# Patient Record
Sex: MALE | Race: ASIAN | NOT HISPANIC OR LATINO
[De-identification: names, ages, dates, MRNs, and addresses within clinical notes are randomized per-mention and may not be internally consistent; named-entity substitution may affect disease eponyms.]

---

## 2022-01-26 ENCOUNTER — TRANSCRIPTION ENCOUNTER (OUTPATIENT)
Age: 72
End: 2022-01-26

## 2022-01-26 VITALS
OXYGEN SATURATION: 95 % | WEIGHT: 171.08 LBS | SYSTOLIC BLOOD PRESSURE: 151 MMHG | TEMPERATURE: 98 F | DIASTOLIC BLOOD PRESSURE: 84 MMHG | HEART RATE: 76 BPM | RESPIRATION RATE: 18 BRPM | HEIGHT: 68 IN

## 2022-01-26 LAB
ALBUMIN SERPL ELPH-MCNC: 4.1 G/DL — SIGNIFICANT CHANGE UP (ref 3.3–5)
ALP SERPL-CCNC: 173 U/L — HIGH (ref 40–120)
ALT FLD-CCNC: 183 U/L — HIGH (ref 10–45)
ANION GAP SERPL CALC-SCNC: 12 MMOL/L — SIGNIFICANT CHANGE UP (ref 5–17)
APTT BLD: 38.3 SEC — HIGH (ref 27.5–35.5)
AST SERPL-CCNC: 519 U/L — HIGH (ref 10–40)
BASOPHILS # BLD AUTO: 0.03 K/UL — SIGNIFICANT CHANGE UP (ref 0–0.2)
BASOPHILS NFR BLD AUTO: 0.2 % — SIGNIFICANT CHANGE UP (ref 0–2)
BILIRUB SERPL-MCNC: 2 MG/DL — HIGH (ref 0.2–1.2)
BLD GP AB SCN SERPL QL: NEGATIVE — SIGNIFICANT CHANGE UP
BUN SERPL-MCNC: 43 MG/DL — HIGH (ref 7–23)
CALCIUM SERPL-MCNC: 8.5 MG/DL — SIGNIFICANT CHANGE UP (ref 8.4–10.5)
CHLORIDE SERPL-SCNC: 100 MMOL/L — SIGNIFICANT CHANGE UP (ref 96–108)
CO2 SERPL-SCNC: 23 MMOL/L — SIGNIFICANT CHANGE UP (ref 22–31)
CREAT SERPL-MCNC: 1.82 MG/DL — HIGH (ref 0.5–1.3)
EOSINOPHIL # BLD AUTO: 0.01 K/UL — SIGNIFICANT CHANGE UP (ref 0–0.5)
EOSINOPHIL NFR BLD AUTO: 0.1 % — SIGNIFICANT CHANGE UP (ref 0–6)
GLUCOSE SERPL-MCNC: 147 MG/DL — HIGH (ref 70–99)
HCT VFR BLD CALC: 42.1 % — SIGNIFICANT CHANGE UP (ref 39–50)
HGB BLD-MCNC: 14.1 G/DL — SIGNIFICANT CHANGE UP (ref 13–17)
IMM GRANULOCYTES NFR BLD AUTO: 0.9 % — SIGNIFICANT CHANGE UP (ref 0–1.5)
INR BLD: 0.99 — SIGNIFICANT CHANGE UP (ref 0.88–1.16)
LACTATE SERPL-SCNC: 1.1 MMOL/L — SIGNIFICANT CHANGE UP (ref 0.5–2)
LYMPHOCYTES # BLD AUTO: 1.38 K/UL — SIGNIFICANT CHANGE UP (ref 1–3.3)
LYMPHOCYTES # BLD AUTO: 10.1 % — LOW (ref 13–44)
MCHC RBC-ENTMCNC: 33.5 GM/DL — SIGNIFICANT CHANGE UP (ref 32–36)
MCHC RBC-ENTMCNC: 33.9 PG — SIGNIFICANT CHANGE UP (ref 27–34)
MCV RBC AUTO: 101.2 FL — HIGH (ref 80–100)
MONOCYTES # BLD AUTO: 0.82 K/UL — SIGNIFICANT CHANGE UP (ref 0–0.9)
MONOCYTES NFR BLD AUTO: 6 % — SIGNIFICANT CHANGE UP (ref 2–14)
NEUTROPHILS # BLD AUTO: 11.27 K/UL — HIGH (ref 1.8–7.4)
NEUTROPHILS NFR BLD AUTO: 82.7 % — HIGH (ref 43–77)
NRBC # BLD: 0 /100 WBCS — SIGNIFICANT CHANGE UP (ref 0–0)
PLATELET # BLD AUTO: 122 K/UL — LOW (ref 150–400)
POTASSIUM SERPL-MCNC: 3.2 MMOL/L — LOW (ref 3.5–5.3)
POTASSIUM SERPL-SCNC: 3.2 MMOL/L — LOW (ref 3.5–5.3)
PROT SERPL-MCNC: 7.5 G/DL — SIGNIFICANT CHANGE UP (ref 6–8.3)
PROTHROM AB SERPL-ACNC: 11.9 SEC — SIGNIFICANT CHANGE UP (ref 10.6–13.6)
RBC # BLD: 4.16 M/UL — LOW (ref 4.2–5.8)
RBC # FLD: 14.6 % — HIGH (ref 10.3–14.5)
RH IG SCN BLD-IMP: POSITIVE — SIGNIFICANT CHANGE UP
SARS-COV-2 RNA SPEC QL NAA+PROBE: NEGATIVE — SIGNIFICANT CHANGE UP
SODIUM SERPL-SCNC: 135 MMOL/L — SIGNIFICANT CHANGE UP (ref 135–145)
WBC # BLD: 13.63 K/UL — HIGH (ref 3.8–10.5)
WBC # FLD AUTO: 13.63 K/UL — HIGH (ref 3.8–10.5)

## 2022-01-26 PROCEDURE — 71045 X-RAY EXAM CHEST 1 VIEW: CPT | Mod: 26

## 2022-01-26 PROCEDURE — 99285 EMERGENCY DEPT VISIT HI MDM: CPT | Mod: 25

## 2022-01-26 PROCEDURE — 93010 ELECTROCARDIOGRAM REPORT: CPT

## 2022-01-26 RX ORDER — SODIUM CHLORIDE 9 MG/ML
1000 INJECTION INTRAMUSCULAR; INTRAVENOUS; SUBCUTANEOUS ONCE
Refills: 0 | Status: COMPLETED | OUTPATIENT
Start: 2022-01-26 | End: 2022-01-26

## 2022-01-26 RX ADMIN — SODIUM CHLORIDE 1000 MILLILITER(S): 9 INJECTION INTRAMUSCULAR; INTRAVENOUS; SUBCUTANEOUS at 23:34

## 2022-01-26 NOTE — ED ADULT NURSE NOTE - OBJECTIVE STATEMENT
Pt presented to er with c/o RUQ discomfort since yesterday. Pt was at [facility] diagnosed with acute cholecystitis. As per family pt requires surgery to remove gallbladder, here for pre-op. Pt denies pain, n/v/d, cp, sob. Pt A&Ox4, ambulatory with steady gait, speaking in clear/full sentences, no acute distress, vital signs stable.

## 2022-01-26 NOTE — ED PROVIDER NOTE - CLINICAL SUMMARY MEDICAL DECISION MAKING FREE TEXT BOX
RUQ abd pain, diagnosed with cholecystitis at [facility]  -check labs  -US  -cxr  -ekg  -surgery consult

## 2022-01-26 NOTE — ED PROVIDER NOTE - OBJECTIVE STATEMENT
71M hx htn, high chol, bph, gout, MM (s/p stem cell transplant), c/o RUQ abd pain. pt states pain ongoing for past 2 days. no n/v/d. no fevers. no cough. no recent travel. decreased appetite.  pt was seen at [facility] and diagnosed with cholecystitis.  pt given ABX.  pt wanted to come here for Dr. Gilmore.

## 2022-01-26 NOTE — ED PROVIDER NOTE - NSICDXPASTMEDICALHX_GEN_ALL_CORE_FT
PAST MEDICAL HISTORY:  BPH (benign prostatic hyperplasia)     Gout     High cholesterol     HTN (hypertension)     Multiple myeloma

## 2022-01-26 NOTE — ED ADULT NURSE NOTE - NSIMPLEMENTINTERV_GEN_ALL_ED
Implemented All Universal Safety Interventions:  [place] to call system. Call bell, personal items and telephone within reach. Instruct patient to call for assistance. Room bathroom lighting operational. Non-slip footwear when patient is off stretcher. Physically safe environment: no spills, clutter or unnecessary equipment. Stretcher in lowest position, wheels locked, appropriate side rails in place.

## 2022-01-27 ENCOUNTER — INPATIENT (INPATIENT)
Facility: HOSPITAL | Age: 72
LOS: 2 days | Discharge: HOME | DRG: 417 | End: 2022-01-30
Attending: SURGERY | Admitting: SURGERY
Payer: MEDICARE

## 2022-01-27 ENCOUNTER — RESULT REVIEW (OUTPATIENT)
Age: 72
End: 2022-01-27

## 2022-01-27 DIAGNOSIS — Z94.84 STEM CELLS TRANSPLANT STATUS: Chronic | ICD-10-CM

## 2022-01-27 LAB
ALBUMIN SERPL ELPH-MCNC: 3.4 G/DL — SIGNIFICANT CHANGE UP (ref 3.3–5)
ALBUMIN SERPL ELPH-MCNC: 3.5 G/DL — SIGNIFICANT CHANGE UP (ref 3.3–5)
ALP SERPL-CCNC: 168 U/L — HIGH (ref 40–120)
ALP SERPL-CCNC: 181 U/L — HIGH (ref 40–120)
ALT FLD-CCNC: 179 U/L — HIGH (ref 10–45)
ALT FLD-CCNC: SIGNIFICANT CHANGE UP U/L (ref 10–45)
ANION GAP SERPL CALC-SCNC: 11 MMOL/L — SIGNIFICANT CHANGE UP (ref 5–17)
ANION GAP SERPL CALC-SCNC: 12 MMOL/L — SIGNIFICANT CHANGE UP (ref 5–17)
ANION GAP SERPL CALC-SCNC: 13 MMOL/L — SIGNIFICANT CHANGE UP (ref 5–17)
APPEARANCE UR: ABNORMAL
AST SERPL-CCNC: 281 U/L — HIGH (ref 10–40)
AST SERPL-CCNC: SIGNIFICANT CHANGE UP U/L (ref 10–40)
BACTERIA # UR AUTO: ABNORMAL /HPF
BILIRUB DIRECT SERPL-MCNC: 0.5 MG/DL — HIGH (ref 0–0.3)
BILIRUB INDIRECT FLD-MCNC: 1 MG/DL — SIGNIFICANT CHANGE UP (ref 0.2–1)
BILIRUB SERPL-MCNC: 1.4 MG/DL — HIGH (ref 0.2–1.2)
BILIRUB SERPL-MCNC: 1.5 MG/DL — HIGH (ref 0.2–1.2)
BILIRUB SERPL-MCNC: 1.6 MG/DL — HIGH (ref 0.2–1.2)
BILIRUB UR-MCNC: NEGATIVE — SIGNIFICANT CHANGE UP
BLD GP AB SCN SERPL QL: NEGATIVE — SIGNIFICANT CHANGE UP
BUN SERPL-MCNC: 39 MG/DL — HIGH (ref 7–23)
BUN SERPL-MCNC: 40 MG/DL — HIGH (ref 7–23)
BUN SERPL-MCNC: 43 MG/DL — HIGH (ref 7–23)
CALCIUM SERPL-MCNC: 8 MG/DL — LOW (ref 8.4–10.5)
CALCIUM SERPL-MCNC: 8.1 MG/DL — LOW (ref 8.4–10.5)
CALCIUM SERPL-MCNC: 8.6 MG/DL — SIGNIFICANT CHANGE UP (ref 8.4–10.5)
CHLORIDE SERPL-SCNC: 104 MMOL/L — SIGNIFICANT CHANGE UP (ref 96–108)
CHLORIDE SERPL-SCNC: 106 MMOL/L — SIGNIFICANT CHANGE UP (ref 96–108)
CHLORIDE SERPL-SCNC: 109 MMOL/L — HIGH (ref 96–108)
CO2 SERPL-SCNC: 19 MMOL/L — LOW (ref 22–31)
CO2 SERPL-SCNC: 19 MMOL/L — LOW (ref 22–31)
CO2 SERPL-SCNC: 20 MMOL/L — LOW (ref 22–31)
COLOR SPEC: YELLOW — SIGNIFICANT CHANGE UP
CREAT SERPL-MCNC: 1.6 MG/DL — HIGH (ref 0.5–1.3)
CREAT SERPL-MCNC: 1.63 MG/DL — HIGH (ref 0.5–1.3)
CREAT SERPL-MCNC: 1.7 MG/DL — HIGH (ref 0.5–1.3)
DIFF PNL FLD: ABNORMAL
EPI CELLS # UR: SIGNIFICANT CHANGE UP /HPF (ref 0–5)
GLUCOSE SERPL-MCNC: 106 MG/DL — HIGH (ref 70–99)
GLUCOSE SERPL-MCNC: 127 MG/DL — HIGH (ref 70–99)
GLUCOSE SERPL-MCNC: 183 MG/DL — HIGH (ref 70–99)
GLUCOSE UR QL: NEGATIVE — SIGNIFICANT CHANGE UP
HCT VFR BLD CALC: 34.5 % — LOW (ref 39–50)
HCT VFR BLD CALC: 39.9 % — SIGNIFICANT CHANGE UP (ref 39–50)
HCV AB S/CO SERPL IA: 0.06 S/CO — SIGNIFICANT CHANGE UP
HCV AB SERPL-IMP: SIGNIFICANT CHANGE UP
HGB BLD-MCNC: 11.2 G/DL — LOW (ref 13–17)
HGB BLD-MCNC: 13.2 G/DL — SIGNIFICANT CHANGE UP (ref 13–17)
HYALINE CASTS # UR AUTO: SIGNIFICANT CHANGE UP /LPF (ref 0–2)
KETONES UR-MCNC: NEGATIVE — SIGNIFICANT CHANGE UP
LEUKOCYTE ESTERASE UR-ACNC: ABNORMAL
LIDOCAIN IGE QN: 210 U/L — HIGH (ref 7–60)
MAGNESIUM SERPL-MCNC: 1.8 MG/DL — SIGNIFICANT CHANGE UP (ref 1.6–2.6)
MAGNESIUM SERPL-MCNC: 1.9 MG/DL — SIGNIFICANT CHANGE UP (ref 1.6–2.6)
MCHC RBC-ENTMCNC: 32.5 GM/DL — SIGNIFICANT CHANGE UP (ref 32–36)
MCHC RBC-ENTMCNC: 32.8 PG — SIGNIFICANT CHANGE UP (ref 27–34)
MCHC RBC-ENTMCNC: 33.1 GM/DL — SIGNIFICANT CHANGE UP (ref 32–36)
MCHC RBC-ENTMCNC: 33.1 PG — SIGNIFICANT CHANGE UP (ref 27–34)
MCV RBC AUTO: 100 FL — SIGNIFICANT CHANGE UP (ref 80–100)
MCV RBC AUTO: 101.2 FL — HIGH (ref 80–100)
NITRITE UR-MCNC: POSITIVE
NRBC # BLD: 0 /100 WBCS — SIGNIFICANT CHANGE UP (ref 0–0)
NRBC # BLD: 0 /100 WBCS — SIGNIFICANT CHANGE UP (ref 0–0)
PH UR: 6 — SIGNIFICANT CHANGE UP (ref 5–8)
PHOSPHATE SERPL-MCNC: 2.6 MG/DL — SIGNIFICANT CHANGE UP (ref 2.5–4.5)
PHOSPHATE SERPL-MCNC: 3.5 MG/DL — SIGNIFICANT CHANGE UP (ref 2.5–4.5)
PLATELET # BLD AUTO: 112 K/UL — LOW (ref 150–400)
PLATELET # BLD AUTO: 117 K/UL — LOW (ref 150–400)
POTASSIUM SERPL-MCNC: 4 MMOL/L — SIGNIFICANT CHANGE UP (ref 3.5–5.3)
POTASSIUM SERPL-MCNC: 4.8 MMOL/L — SIGNIFICANT CHANGE UP (ref 3.5–5.3)
POTASSIUM SERPL-MCNC: SIGNIFICANT CHANGE UP MMOL/L (ref 3.5–5.3)
POTASSIUM SERPL-SCNC: 4 MMOL/L — SIGNIFICANT CHANGE UP (ref 3.5–5.3)
POTASSIUM SERPL-SCNC: 4.8 MMOL/L — SIGNIFICANT CHANGE UP (ref 3.5–5.3)
POTASSIUM SERPL-SCNC: SIGNIFICANT CHANGE UP MMOL/L (ref 3.5–5.3)
PROT SERPL-MCNC: 6.7 G/DL — SIGNIFICANT CHANGE UP (ref 6–8.3)
PROT SERPL-MCNC: 6.8 G/DL — SIGNIFICANT CHANGE UP (ref 6–8.3)
PROT UR-MCNC: 100 MG/DL
RBC # BLD: 3.41 M/UL — LOW (ref 4.2–5.8)
RBC # BLD: 3.99 M/UL — LOW (ref 4.2–5.8)
RBC # FLD: 14.3 % — SIGNIFICANT CHANGE UP (ref 10.3–14.5)
RBC # FLD: 14.5 % — SIGNIFICANT CHANGE UP (ref 10.3–14.5)
RBC CASTS # UR COMP ASSIST: ABNORMAL /HPF
RH IG SCN BLD-IMP: POSITIVE — SIGNIFICANT CHANGE UP
SODIUM SERPL-SCNC: 136 MMOL/L — SIGNIFICANT CHANGE UP (ref 135–145)
SODIUM SERPL-SCNC: 137 MMOL/L — SIGNIFICANT CHANGE UP (ref 135–145)
SODIUM SERPL-SCNC: 140 MMOL/L — SIGNIFICANT CHANGE UP (ref 135–145)
SP GR SPEC: 1.02 — SIGNIFICANT CHANGE UP (ref 1–1.03)
UROBILINOGEN FLD QL: 0.2 E.U./DL — SIGNIFICANT CHANGE UP
WBC # BLD: 11.05 K/UL — HIGH (ref 3.8–10.5)
WBC # BLD: 11.92 K/UL — HIGH (ref 3.8–10.5)
WBC # FLD AUTO: 11.05 K/UL — HIGH (ref 3.8–10.5)
WBC # FLD AUTO: 11.92 K/UL — HIGH (ref 3.8–10.5)
WBC UR QL: > 10 /HPF

## 2022-01-27 PROCEDURE — 76705 ECHO EXAM OF ABDOMEN: CPT | Mod: 26

## 2022-01-27 PROCEDURE — 99223 1ST HOSP IP/OBS HIGH 75: CPT | Mod: 57

## 2022-01-27 PROCEDURE — 88304 TISSUE EXAM BY PATHOLOGIST: CPT | Mod: 26

## 2022-01-27 PROCEDURE — 88300 SURGICAL PATH GROSS: CPT | Mod: 26,59

## 2022-01-27 PROCEDURE — 74183 MRI ABD W/O CNTR FLWD CNTR: CPT | Mod: 26

## 2022-01-27 RX ORDER — ACETAMINOPHEN 500 MG
500 TABLET ORAL ONCE
Refills: 0 | Status: COMPLETED | OUTPATIENT
Start: 2022-01-27 | End: 2022-01-27

## 2022-01-27 RX ORDER — CALCITRIOL 0.5 UG/1
1 CAPSULE ORAL
Qty: 0 | Refills: 0 | DISCHARGE

## 2022-01-27 RX ORDER — OXYCODONE HYDROCHLORIDE 5 MG/1
10 TABLET ORAL EVERY 6 HOURS
Refills: 0 | Status: DISCONTINUED | OUTPATIENT
Start: 2022-01-27 | End: 2022-01-28

## 2022-01-27 RX ORDER — ACETAMINOPHEN 500 MG
650 TABLET ORAL EVERY 6 HOURS
Refills: 0 | Status: DISCONTINUED | OUTPATIENT
Start: 2022-01-27 | End: 2022-01-28

## 2022-01-27 RX ORDER — OMEGA-3 ACID ETHYL ESTERS 1 G
1 CAPSULE ORAL
Qty: 0 | Refills: 0 | DISCHARGE

## 2022-01-27 RX ORDER — ALLOPURINOL 300 MG
1 TABLET ORAL
Qty: 0 | Refills: 0 | DISCHARGE

## 2022-01-27 RX ORDER — POTASSIUM CHLORIDE 20 MEQ
10 PACKET (EA) ORAL
Refills: 0 | Status: COMPLETED | OUTPATIENT
Start: 2022-01-27 | End: 2022-01-27

## 2022-01-27 RX ORDER — CHOLECALCIFEROL (VITAMIN D3) 125 MCG
1 CAPSULE ORAL
Qty: 0 | Refills: 0 | DISCHARGE

## 2022-01-27 RX ORDER — OXYCODONE HYDROCHLORIDE 5 MG/1
5 TABLET ORAL EVERY 6 HOURS
Refills: 0 | Status: DISCONTINUED | OUTPATIENT
Start: 2022-01-27 | End: 2022-01-28

## 2022-01-27 RX ORDER — METRONIDAZOLE 500 MG
500 TABLET ORAL EVERY 8 HOURS
Refills: 0 | Status: DISCONTINUED | OUTPATIENT
Start: 2022-01-27 | End: 2022-01-27

## 2022-01-27 RX ORDER — SODIUM CHLORIDE 9 MG/ML
1000 INJECTION, SOLUTION INTRAVENOUS
Refills: 0 | Status: DISCONTINUED | OUTPATIENT
Start: 2022-01-27 | End: 2022-01-28

## 2022-01-27 RX ORDER — HYDROMORPHONE HYDROCHLORIDE 2 MG/ML
0.5 INJECTION INTRAMUSCULAR; INTRAVENOUS; SUBCUTANEOUS
Refills: 0 | Status: DISCONTINUED | OUTPATIENT
Start: 2022-01-27 | End: 2022-01-28

## 2022-01-27 RX ORDER — LABETALOL HCL 100 MG
1 TABLET ORAL
Qty: 0 | Refills: 0 | DISCHARGE

## 2022-01-27 RX ORDER — SODIUM CHLORIDE 9 MG/ML
1000 INJECTION, SOLUTION INTRAVENOUS
Refills: 0 | Status: DISCONTINUED | OUTPATIENT
Start: 2022-01-27 | End: 2022-01-27

## 2022-01-27 RX ORDER — ONDANSETRON 8 MG/1
4 TABLET, FILM COATED ORAL EVERY 6 HOURS
Refills: 0 | Status: DISCONTINUED | OUTPATIENT
Start: 2022-01-27 | End: 2022-01-30

## 2022-01-27 RX ORDER — CEFTRIAXONE 500 MG/1
1000 INJECTION, POWDER, FOR SOLUTION INTRAMUSCULAR; INTRAVENOUS EVERY 24 HOURS
Refills: 0 | Status: DISCONTINUED | OUTPATIENT
Start: 2022-01-27 | End: 2022-01-27

## 2022-01-27 RX ORDER — FINASTERIDE 5 MG/1
1 TABLET, FILM COATED ORAL
Qty: 0 | Refills: 0 | DISCHARGE

## 2022-01-27 RX ORDER — ATORVASTATIN CALCIUM 80 MG/1
1 TABLET, FILM COATED ORAL
Qty: 0 | Refills: 0 | DISCHARGE

## 2022-01-27 RX ORDER — HEPARIN SODIUM 5000 [USP'U]/ML
5000 INJECTION INTRAVENOUS; SUBCUTANEOUS ONCE
Refills: 0 | Status: COMPLETED | OUTPATIENT
Start: 2022-01-27 | End: 2022-01-27

## 2022-01-27 RX ORDER — LABETALOL HCL 100 MG
20 TABLET ORAL EVERY 6 HOURS
Refills: 0 | Status: DISCONTINUED | OUTPATIENT
Start: 2022-01-27 | End: 2022-01-27

## 2022-01-27 RX ORDER — LABETALOL HCL 100 MG
20 TABLET ORAL EVERY 6 HOURS
Refills: 0 | Status: DISCONTINUED | OUTPATIENT
Start: 2022-01-27 | End: 2022-01-30

## 2022-01-27 RX ORDER — TAMSULOSIN HYDROCHLORIDE 0.4 MG/1
1 CAPSULE ORAL
Qty: 0 | Refills: 0 | DISCHARGE

## 2022-01-27 RX ORDER — HEPARIN SODIUM 5000 [USP'U]/ML
5000 INJECTION INTRAVENOUS; SUBCUTANEOUS EVERY 8 HOURS
Refills: 0 | Status: DISCONTINUED | OUTPATIENT
Start: 2022-01-27 | End: 2022-01-27

## 2022-01-27 RX ADMIN — CEFTRIAXONE 1000 MILLIGRAM(S): 500 INJECTION, POWDER, FOR SOLUTION INTRAMUSCULAR; INTRAVENOUS at 02:34

## 2022-01-27 RX ADMIN — Medication 62.5 MILLIMOLE(S): at 16:03

## 2022-01-27 RX ADMIN — Medication 100 MILLIEQUIVALENT(S): at 07:21

## 2022-01-27 RX ADMIN — HYDROMORPHONE HYDROCHLORIDE 0.5 MILLIGRAM(S): 2 INJECTION INTRAMUSCULAR; INTRAVENOUS; SUBCUTANEOUS at 22:20

## 2022-01-27 RX ADMIN — SODIUM CHLORIDE 110 MILLILITER(S): 9 INJECTION, SOLUTION INTRAVENOUS at 05:08

## 2022-01-27 RX ADMIN — Medication 500 MILLIGRAM(S): at 15:53

## 2022-01-27 RX ADMIN — Medication 20 MILLIGRAM(S): at 07:21

## 2022-01-27 RX ADMIN — SODIUM CHLORIDE 1000 MILLILITER(S): 9 INJECTION INTRAMUSCULAR; INTRAVENOUS; SUBCUTANEOUS at 03:27

## 2022-01-27 RX ADMIN — Medication 100 MILLIGRAM(S): at 02:40

## 2022-01-27 RX ADMIN — Medication 200 MILLIGRAM(S): at 11:11

## 2022-01-27 RX ADMIN — Medication 100 MILLIGRAM(S): at 16:03

## 2022-01-27 RX ADMIN — HYDROMORPHONE HYDROCHLORIDE 0.5 MILLIGRAM(S): 2 INJECTION INTRAMUSCULAR; INTRAVENOUS; SUBCUTANEOUS at 22:05

## 2022-01-27 RX ADMIN — Medication 100 MILLIEQUIVALENT(S): at 05:09

## 2022-01-27 RX ADMIN — Medication 20 MILLIGRAM(S): at 16:03

## 2022-01-27 RX ADMIN — Medication 100 MILLIEQUIVALENT(S): at 09:40

## 2022-01-27 RX ADMIN — HEPARIN SODIUM 5000 UNIT(S): 5000 INJECTION INTRAVENOUS; SUBCUTANEOUS at 03:26

## 2022-01-27 NOTE — BRIEF OPERATIVE NOTE - OPERATION/FINDINGS
Laparoscopic Cholecystectomy    Access via Mike Cutdown. GB identified, edematous, inflamed w/ purulence and rind in surrounding area. Fundus retracted cephalad, unable to safely identify structures, top down approach taken. GB bluntly dissected off cystic plate to infundibulum. Cystic artery identified, clipped, and divided. Unable to distinguish neck from cystic duct, infundibulum transected. Further dissection of GB stump until cystic duct evident. Endoloop placed around cystic duct and remaining GB excised from duct. GB removed w/ endocatch bag. Hemostasis ensured. Surgical bed irrigated. 19Fr Suman placed beneath the liver. Fascia of umbilical port closed w/ Vicryl suture. Skin closed w/ Monocryl

## 2022-01-27 NOTE — H&P ADULT - NSHPLABSRESULTS_GEN_ALL_CORE
LABS:                        14.1   13.63 )-----------( 122      ( 26 Jan 2022 22:47 )             42.1     01-26    135  |  100  |  43<H>  ----------------------------<  147<H>  3.2<L>   |  23  |  1.82<H>    Ca    8.5      26 Jan 2022 22:47    TPro  7.5  /  Alb  4.1  /  TBili  2.0<H>  /  DBili  x   /  AST  519<H>  /  ALT  183<H>  /  AlkPhos  173<H>  01-26    PT/INR - ( 26 Jan 2022 22:47 )   PT: 11.9 sec;   INR: 0.99          PTT - ( 26 Jan 2022 22:47 )  PTT:38.3 sec      RADIOLOGY & ADDITIONAL STUDIES:

## 2022-01-27 NOTE — H&P ADULT - NSHPPHYSICALEXAM_GEN_ALL_CORE
Vital Signs Last 24 Hrs  T(C): 36.7 (26 Jan 2022 22:06), Max: 36.7 (26 Jan 2022 22:06)  T(F): 98 (26 Jan 2022 22:06), Max: 98 (26 Jan 2022 22:06)  HR: 76 (26 Jan 2022 22:06) (76 - 76)  BP: 151/84 (26 Jan 2022 22:06) (151/84 - 151/84)  BP(mean): --  RR: 18 (26 Jan 2022 22:06) (18 - 18)  SpO2: 95% (26 Jan 2022 22:06) (95% - 95%)  I&O's Detail    PHYSICAL EXAM:  General: NAD, resting comfortably in bed  C/V: NSR  HEENT: NA/AT. Anicteric  Pulm: Nonlabored breathing, no respiratory distress  Abd: soft, mildly distended, nontympanic, moderate TTP in RUQ. Harvey's positive. No rebound or guarding  Extrem: WWP, no edema  Skin: warm, no rashes  Psych: calm, appropriate

## 2022-01-27 NOTE — PROGRESS NOTE ADULT - ASSESSMENT
71M with pmh of HTN, HLD, gout, BPH, MM s/p stem cell transplant and no previous abdominal surgeries who present to [facility] with choledocholithiasis vs gallstone pancreatitis.    Plan:  stat MRCP  Admitted to general surgery team 1 under Dr. Gilmore  NPO/IVF  Strict I&Os  CTX/flagyl (1/27-)  F/u AM labs  T+S  Analgesics PRN  Antiemetics PRN  VTE PPX with SCDs and SQH  Discussed with surgery attending

## 2022-01-27 NOTE — H&P ADULT - ASSESSMENT
71M with pmh of HTN, HLD, gout, BPH, MM s/p stem cell transplant and no previous abdominal surgeries who present to [facility] with choledocholithiasis vs gallstone pancreatitis.    Admit to general surgery team 1 under Dr. Gilmore  NPO/IVF  Strict I&Os  CTX/flagyl (1/27-)  F/u AM labs  T+S  CXR  EKG  Analgesics PRN  Antiemetics PRN  VTE PPX with SCDs and SQH  Discussed with surgery attending     71M with pmh of HTN, HLD, gout, BPH, MM s/p stem cell transplant and no previous abdominal surgeries who present to [facility] with choledocholithiasis vs gallstone pancreatitis.    Admit to general surgery team 1 under Dr. Gilmore  NPO/IVF  Strict I&Os  CTX/flagyl (-)  F/u AM labs  T+S  CXR  EKG  Analgesics PRN  Antiemetics PRN  VTE PPX with SCDs and SQH  Discussed with surgery attending    Surgery Senior AttendinM HTN, HLD, BPH, MM, presents with 2d of RUQ pain after fatty meal, worsening since then. +F/BM, -N/V. Saw PCP who sent him to [facility] ED, where WBC 15, U/s with acute cholecystitis. Transferred here for management. Upon evaluation patient states pain controlled on medications. Afebrile, VSS. Exam with abdomen softly distended, RUQ TTP. WBC 13.6, TBili 2.0, AST//183 (normal at [facility]), Lipase ~1300. Repeat u/s shows 1.6cm non-mobile stone at GB neck, CBD 1.0cm (normal at [facility]). Patient will be admitted for choledocholithiasis vs. gallstone pancreatitis. Plan as above, NPO for possible procedure, IVF, IV Abx (CTX/Flagyl), pending final discussion with attending

## 2022-01-27 NOTE — PROGRESS NOTE ADULT - ATTENDING COMMENTS
as noted. Labs c/w gallstone pancreatitis. MRI without evidence of choledocholithiasis. To OR later today

## 2022-01-27 NOTE — H&P ADULT - HISTORY OF PRESENT ILLNESS
HPI:  71M with pmh of HTN, HLD, gout, BPH, MM s/p stem cell transplant and no previous abdominal surgeries who present to [facility] from [facility] for further evaluation for acute cholecystitis by Dr. Gilmore. Reports severe onset of sharp RUQ pain following a meal two days ago. Pain was constant and mildly relieved by tylenol. Denies any associated f/c nausea or vomiting. Due to persistence of pain saw his PCP today who recommended he go to the ED for furhter evaluation. At [facility] pt was diagnosed with acute calculous cholecystitis and given a dose of CTX and flagyl. Last bowel movement was this morning, brown and well formed; nonmelanotic, nonbloody. Reports "tea-colored" urine, denies hematuria or dysuria.     In the ED vitals were all wnl; Temp 36.7, HR 76, /84, RR 18, SpO2 95%. Initial labs notable for WBC 13.6 (82% PMNs), K 3.2, BUN 43, Cr 1.82, TBili 2.0, Alp 173, , , Lipase 1382. Lactate wnl 1.1. Abd US notable for distended GB, GBWT, nonmobile stone in GB neck, with distal CBD measuring 1.0mm. Was bolused NS 1L.     PMH: HTN, HLD, gout, BPH, MM s/p stem cell transplant   PSH: no previous abdominal surgeries  Allergies: NKDA  Medications: Allopurinol 300mg, Atorvastatin 20mg, Calcitriol 0.25mcg, Cholecalciferol 125mcg, Finasteride 5, Fish Oil 1,200, Labetolol 100mg, Tamsulosin 0.4mg.   SH: No h/o tobacco use or EtOH use  FH: Family h/o gallstones. No h/o GB CA

## 2022-01-28 ENCOUNTER — TRANSCRIPTION ENCOUNTER (OUTPATIENT)
Age: 72
End: 2022-01-28

## 2022-01-28 LAB
ALBUMIN SERPL ELPH-MCNC: 2.8 G/DL — LOW (ref 3.3–5)
ALP SERPL-CCNC: 147 U/L — HIGH (ref 40–120)
ALT FLD-CCNC: 121 U/L — HIGH (ref 10–45)
ANION GAP SERPL CALC-SCNC: 12 MMOL/L — SIGNIFICANT CHANGE UP (ref 5–17)
AST SERPL-CCNC: 159 U/L — HIGH (ref 10–40)
BILIRUB SERPL-MCNC: 1 MG/DL — SIGNIFICANT CHANGE UP (ref 0.2–1.2)
BUN SERPL-MCNC: 37 MG/DL — HIGH (ref 7–23)
CALCIUM SERPL-MCNC: 7.7 MG/DL — LOW (ref 8.4–10.5)
CHLORIDE SERPL-SCNC: 107 MMOL/L — SIGNIFICANT CHANGE UP (ref 96–108)
CO2 SERPL-SCNC: 19 MMOL/L — LOW (ref 22–31)
CREAT SERPL-MCNC: 1.8 MG/DL — HIGH (ref 0.5–1.3)
CULTURE RESULTS: SIGNIFICANT CHANGE UP
GLUCOSE SERPL-MCNC: 163 MG/DL — HIGH (ref 70–99)
HCT VFR BLD CALC: 31.9 % — LOW (ref 39–50)
HCT VFR BLD CALC: 37.5 % — LOW (ref 39–50)
HGB BLD-MCNC: 10.4 G/DL — LOW (ref 13–17)
HGB BLD-MCNC: 12.2 G/DL — LOW (ref 13–17)
MAGNESIUM SERPL-MCNC: 2.1 MG/DL — SIGNIFICANT CHANGE UP (ref 1.6–2.6)
MCHC RBC-ENTMCNC: 32.5 GM/DL — SIGNIFICANT CHANGE UP (ref 32–36)
MCHC RBC-ENTMCNC: 32.6 GM/DL — SIGNIFICANT CHANGE UP (ref 32–36)
MCHC RBC-ENTMCNC: 32.6 PG — SIGNIFICANT CHANGE UP (ref 27–34)
MCHC RBC-ENTMCNC: 32.9 PG — SIGNIFICANT CHANGE UP (ref 27–34)
MCV RBC AUTO: 100.3 FL — HIGH (ref 80–100)
MCV RBC AUTO: 100.9 FL — HIGH (ref 80–100)
NRBC # BLD: 0 /100 WBCS — SIGNIFICANT CHANGE UP (ref 0–0)
NRBC # BLD: 0 /100 WBCS — SIGNIFICANT CHANGE UP (ref 0–0)
PHOSPHATE SERPL-MCNC: 4.2 MG/DL — SIGNIFICANT CHANGE UP (ref 2.5–4.5)
PLATELET # BLD AUTO: 107 K/UL — LOW (ref 150–400)
PLATELET # BLD AUTO: 154 K/UL — SIGNIFICANT CHANGE UP (ref 150–400)
POTASSIUM SERPL-MCNC: 4.5 MMOL/L — SIGNIFICANT CHANGE UP (ref 3.5–5.3)
POTASSIUM SERPL-SCNC: 4.5 MMOL/L — SIGNIFICANT CHANGE UP (ref 3.5–5.3)
PROT SERPL-MCNC: 5.8 G/DL — LOW (ref 6–8.3)
RBC # BLD: 3.16 M/UL — LOW (ref 4.2–5.8)
RBC # BLD: 3.74 M/UL — LOW (ref 4.2–5.8)
RBC # FLD: 14.4 % — SIGNIFICANT CHANGE UP (ref 10.3–14.5)
RBC # FLD: 14.4 % — SIGNIFICANT CHANGE UP (ref 10.3–14.5)
SODIUM SERPL-SCNC: 138 MMOL/L — SIGNIFICANT CHANGE UP (ref 135–145)
SPECIMEN SOURCE: SIGNIFICANT CHANGE UP
WBC # BLD: 11.84 K/UL — HIGH (ref 3.8–10.5)
WBC # BLD: 16.77 K/UL — HIGH (ref 3.8–10.5)
WBC # FLD AUTO: 11.84 K/UL — HIGH (ref 3.8–10.5)
WBC # FLD AUTO: 16.77 K/UL — HIGH (ref 3.8–10.5)

## 2022-01-28 PROCEDURE — 47562 LAPAROSCOPIC CHOLECYSTECTOMY: CPT | Mod: 22

## 2022-01-28 PROCEDURE — 74019 RADEX ABDOMEN 2 VIEWS: CPT | Mod: 26

## 2022-01-28 RX ORDER — ACETAMINOPHEN 500 MG
1000 TABLET ORAL ONCE
Refills: 0 | Status: COMPLETED | OUTPATIENT
Start: 2022-01-29 | End: 2022-01-29

## 2022-01-28 RX ORDER — CEFTRIAXONE 500 MG/1
1000 INJECTION, POWDER, FOR SOLUTION INTRAMUSCULAR; INTRAVENOUS ONCE
Refills: 0 | Status: COMPLETED | OUTPATIENT
Start: 2022-01-28 | End: 2022-01-28

## 2022-01-28 RX ORDER — ACETAMINOPHEN 500 MG
1000 TABLET ORAL ONCE
Refills: 0 | Status: COMPLETED | OUTPATIENT
Start: 2022-01-28 | End: 2022-01-29

## 2022-01-28 RX ORDER — ACETAMINOPHEN 500 MG
1000 TABLET ORAL ONCE
Refills: 0 | Status: COMPLETED | OUTPATIENT
Start: 2022-01-28 | End: 2022-01-28

## 2022-01-28 RX ORDER — CEFTRIAXONE 500 MG/1
INJECTION, POWDER, FOR SOLUTION INTRAMUSCULAR; INTRAVENOUS
Refills: 0 | Status: DISCONTINUED | OUTPATIENT
Start: 2022-01-28 | End: 2022-01-30

## 2022-01-28 RX ORDER — CEFTRIAXONE 500 MG/1
1000 INJECTION, POWDER, FOR SOLUTION INTRAMUSCULAR; INTRAVENOUS ONCE
Refills: 0 | Status: DISCONTINUED | OUTPATIENT
Start: 2022-01-28 | End: 2022-01-28

## 2022-01-28 RX ORDER — CEFTRIAXONE 500 MG/1
INJECTION, POWDER, FOR SOLUTION INTRAMUSCULAR; INTRAVENOUS
Refills: 0 | Status: DISCONTINUED | OUTPATIENT
Start: 2022-01-28 | End: 2022-01-28

## 2022-01-28 RX ORDER — SODIUM CHLORIDE 9 MG/ML
1000 INJECTION, SOLUTION INTRAVENOUS
Refills: 0 | Status: DISCONTINUED | OUTPATIENT
Start: 2022-01-28 | End: 2022-01-29

## 2022-01-28 RX ORDER — ACETAMINOPHEN 500 MG
1000 TABLET ORAL ONCE
Refills: 0 | Status: DISCONTINUED | OUTPATIENT
Start: 2022-01-29 | End: 2022-01-29

## 2022-01-28 RX ORDER — CEFTRIAXONE 500 MG/1
1000 INJECTION, POWDER, FOR SOLUTION INTRAMUSCULAR; INTRAVENOUS EVERY 24 HOURS
Refills: 0 | Status: DISCONTINUED | OUTPATIENT
Start: 2022-01-29 | End: 2022-01-30

## 2022-01-28 RX ORDER — METRONIDAZOLE 500 MG
500 TABLET ORAL EVERY 8 HOURS
Refills: 0 | Status: DISCONTINUED | OUTPATIENT
Start: 2022-01-28 | End: 2022-01-30

## 2022-01-28 RX ADMIN — Medication 650 MILLIGRAM(S): at 01:00

## 2022-01-28 RX ADMIN — Medication 20 MILLIGRAM(S): at 18:47

## 2022-01-28 RX ADMIN — Medication 650 MILLIGRAM(S): at 00:00

## 2022-01-28 RX ADMIN — Medication 100 MILLIGRAM(S): at 12:08

## 2022-01-28 RX ADMIN — Medication 400 MILLIGRAM(S): at 19:28

## 2022-01-28 RX ADMIN — Medication 400 MILLIGRAM(S): at 13:16

## 2022-01-28 RX ADMIN — Medication 1000 MILLIGRAM(S): at 20:00

## 2022-01-28 RX ADMIN — Medication 20 MILLIGRAM(S): at 12:08

## 2022-01-28 RX ADMIN — Medication 20 MILLIGRAM(S): at 23:39

## 2022-01-28 RX ADMIN — Medication 1000 MILLIGRAM(S): at 14:19

## 2022-01-28 RX ADMIN — SODIUM CHLORIDE 85 MILLILITER(S): 9 INJECTION, SOLUTION INTRAVENOUS at 14:46

## 2022-01-28 RX ADMIN — Medication 650 MILLIGRAM(S): at 06:22

## 2022-01-28 RX ADMIN — CEFTRIAXONE 100 MILLIGRAM(S): 500 INJECTION, POWDER, FOR SOLUTION INTRAMUSCULAR; INTRAVENOUS at 13:17

## 2022-01-28 RX ADMIN — Medication 100 MILLIGRAM(S): at 21:58

## 2022-01-28 NOTE — DISCHARGE NOTE PROVIDER - NSDCACTIVITY_GEN_ALL_CORE
Return to Work/School allowed/Sex allowed/Showering allowed/Stairs allowed/Driving allowed/Walking - Indoors allowed/No heavy lifting/straining/Walking - Outdoors allowed/Follow Instructions Provided by your Surgical Team

## 2022-01-28 NOTE — PROGRESS NOTE ADULT - ASSESSMENT
71M with pmh of HTN, HLD, gout, BPH, MM s/p stem cell transplant and no previous abdominal surgeries who present to [facility] with choledocholithiasis vs gallstone pancreatitis now s/p laparoscopic cholecystectomy on 1/27.    Admit to general surgery team 1   Pain/nausea control  NPO/IVF  Strict I&Os  CTX/flagyl (1/27-)  SCDs  AM labs

## 2022-01-28 NOTE — DISCHARGE NOTE PROVIDER - NSDCFUADDINST_GEN_ALL_CORE_FT
Follow up with Dr. Gilmore in 1 week. Call the office at  to schedule your appointment.  You may shower; soap and water over incision sites. Do not scrub. Pat dry when done. No tub bathing or swimming until cleared. Keep incision sites out of the sun as scars will darken. No heavy lifting (>10lbs) or strenuous exercise. Diet: Regular diet as tolerated. 64 fluid ounces water daily. Drink small sips throughout the day. Continue diet as outlined by your surgeon. You should be urinating at least 3-4x per day. Call the office if you experience increasing abdominal pain, nausea, vomiting, or temperature >100.4F.  NO ASPIRIN OR NSAIDs until approved by Dr. Gilmore. Avoid alcoholic beverages until cleared by Dr. Gilmore.

## 2022-01-28 NOTE — PROGRESS NOTE ADULT - ASSESSMENT
71M presented with choledocholithiasis +/- gallstone pancreatitis now s/p laparoscopic cholecystectomy on 1/27. Patient doing well. SAMEER serosanguinous. Appropriate tenderness.     PLAN:  - SAMEER maintenance  - Pain/nausea control  - Advance to regular low fat diet  - HLIV  - Dispo: possible discharge later today

## 2022-01-28 NOTE — DISCHARGE NOTE PROVIDER - NSDCCPCAREPLAN_GEN_ALL_CORE_FT
PRINCIPAL DISCHARGE DIAGNOSIS  Diagnosis: Acute cholecystitis  Assessment and Plan of Treatment: s/p laparoscopic cholecystectomy. Patient doing well post-operatively. Patient deemed stable for discharge. Patient will f/u in clinic.      SECONDARY DISCHARGE DIAGNOSES  Diagnosis: Gallstone pancreatitis  Assessment and Plan of Treatment:

## 2022-01-28 NOTE — DISCHARGE NOTE PROVIDER - CARE PROVIDER_API CALL
Neo Gilmore)  Surgery  [address] [address]  Phone: [phone]  Fax: [phone]  Follow Up Time:

## 2022-01-28 NOTE — DISCHARGE NOTE PROVIDER - NSDCMRMEDTOKEN_GEN_ALL_CORE_FT
allopurinol 300 mg oral tablet: 1 tab(s) orally once a day  atorvastatin 20 mg oral tablet: 1 tab(s) orally once a day  calcitriol 0.25 mcg oral capsule: 1 cap(s) orally once a day  cholecalciferol 125 mcg (5000 intl units) oral capsule: 1 cap(s) orally once a day  finasteride 5 mg oral tablet: 1 tab(s) orally once a day  Fish Oil 1200 mg oral capsule: 1 cap(s) orally 3 times a day  labetalol 100 mg oral tablet: 1 tab(s) orally 2 times a day  tamsulosin 0.4 mg oral capsule: 1 cap(s) orally once a day

## 2022-01-28 NOTE — DISCHARGE NOTE PROVIDER - HOSPITAL COURSE
Patient is a 71M with pmh of HTN, HLD, gout, BPH, MM s/p stem cell transplant and no previous abdominal surgeries who presented to [facility] from [facility] for RUQ pain. Abdominal ultrasound upon admission showed, "Distended gallbladder with 1.6 cm nonmobile stone at the gallbladder neck, trace pericholecystic fluid and borderline wall thickening. Findings are suspicious for cholecystitis. Mildly dilated CBD, measuring up to 1 cm. 1.4 cm round echogenic upper pole right renal lesion, may represent small angiomyolipoma. Consider further evaluation with nonemergent renal MR protocol." Labs showed Lipase 1382 & Alkaline Phosphatase of 168. Subsequent MRCP on 1/28 showed, "No biliary dilatation. No choledocholithiasis. Acute calculus cholecystitis." Patient subsequently underwent laparoscopic cholecystectomy on 1/27. Patient tolerated the procedure well. Patient's post operative course was uncomplicated. Patient received maintenance IV fluids, an incentive spirometer with instructions, daily labs, as well as home medications and a diet. Patient met post-operative milestones. Pain and nausea was well controlled. Patient was tolerating diet without nausea or vomiting. Patient was ambulating without difficulties. Patient was stable and deemed appropriate for discharge. He was discharged home in good condition with follow up instructions. He will follow-up in clinic.

## 2022-01-29 LAB
ALBUMIN SERPL ELPH-MCNC: 3.4 G/DL — SIGNIFICANT CHANGE UP (ref 3.3–5)
ALP SERPL-CCNC: 142 U/L — HIGH (ref 40–120)
ALT FLD-CCNC: 85 U/L — HIGH (ref 10–45)
ANION GAP SERPL CALC-SCNC: 14 MMOL/L — SIGNIFICANT CHANGE UP (ref 5–17)
AST SERPL-CCNC: 64 U/L — HIGH (ref 10–40)
BILIRUB SERPL-MCNC: 0.6 MG/DL — SIGNIFICANT CHANGE UP (ref 0.2–1.2)
BUN SERPL-MCNC: 45 MG/DL — HIGH (ref 7–23)
CALCIUM SERPL-MCNC: 8.4 MG/DL — SIGNIFICANT CHANGE UP (ref 8.4–10.5)
CHLORIDE SERPL-SCNC: 108 MMOL/L — SIGNIFICANT CHANGE UP (ref 96–108)
CO2 SERPL-SCNC: 20 MMOL/L — LOW (ref 22–31)
CREAT SERPL-MCNC: 2.09 MG/DL — HIGH (ref 0.5–1.3)
GLUCOSE SERPL-MCNC: 128 MG/DL — HIGH (ref 70–99)
HCT VFR BLD CALC: 37 % — LOW (ref 39–50)
HGB BLD-MCNC: 11.6 G/DL — LOW (ref 13–17)
MAGNESIUM SERPL-MCNC: 2.4 MG/DL — SIGNIFICANT CHANGE UP (ref 1.6–2.6)
MCHC RBC-ENTMCNC: 31.4 GM/DL — LOW (ref 32–36)
MCHC RBC-ENTMCNC: 32.6 PG — SIGNIFICANT CHANGE UP (ref 27–34)
MCV RBC AUTO: 103.9 FL — HIGH (ref 80–100)
NRBC # BLD: 0 /100 WBCS — SIGNIFICANT CHANGE UP (ref 0–0)
PHOSPHATE SERPL-MCNC: 3.2 MG/DL — SIGNIFICANT CHANGE UP (ref 2.5–4.5)
PLATELET # BLD AUTO: 180 K/UL — SIGNIFICANT CHANGE UP (ref 150–400)
POTASSIUM SERPL-MCNC: 4.6 MMOL/L — SIGNIFICANT CHANGE UP (ref 3.5–5.3)
POTASSIUM SERPL-SCNC: 4.6 MMOL/L — SIGNIFICANT CHANGE UP (ref 3.5–5.3)
PROT SERPL-MCNC: 6.8 G/DL — SIGNIFICANT CHANGE UP (ref 6–8.3)
RBC # BLD: 3.56 M/UL — LOW (ref 4.2–5.8)
RBC # FLD: 14.5 % — SIGNIFICANT CHANGE UP (ref 10.3–14.5)
SODIUM SERPL-SCNC: 142 MMOL/L — SIGNIFICANT CHANGE UP (ref 135–145)
WBC # BLD: 13.65 K/UL — HIGH (ref 3.8–10.5)
WBC # FLD AUTO: 13.65 K/UL — HIGH (ref 3.8–10.5)

## 2022-01-29 RX ORDER — ACETAMINOPHEN 500 MG
650 TABLET ORAL EVERY 6 HOURS
Refills: 0 | Status: DISCONTINUED | OUTPATIENT
Start: 2022-01-29 | End: 2022-01-30

## 2022-01-29 RX ORDER — HEPARIN SODIUM 5000 [USP'U]/ML
5000 INJECTION INTRAVENOUS; SUBCUTANEOUS EVERY 8 HOURS
Refills: 0 | Status: DISCONTINUED | OUTPATIENT
Start: 2022-01-29 | End: 2022-01-30

## 2022-01-29 RX ADMIN — Medication 20 MILLIGRAM(S): at 06:00

## 2022-01-29 RX ADMIN — Medication 20 MILLIGRAM(S): at 17:52

## 2022-01-29 RX ADMIN — Medication 20 MILLIGRAM(S): at 13:04

## 2022-01-29 RX ADMIN — HEPARIN SODIUM 5000 UNIT(S): 5000 INJECTION INTRAVENOUS; SUBCUTANEOUS at 10:27

## 2022-01-29 RX ADMIN — CEFTRIAXONE 100 MILLIGRAM(S): 500 INJECTION, POWDER, FOR SOLUTION INTRAMUSCULAR; INTRAVENOUS at 13:05

## 2022-01-29 RX ADMIN — Medication 100 MILLIGRAM(S): at 06:20

## 2022-01-29 RX ADMIN — Medication 100 MILLIGRAM(S): at 22:36

## 2022-01-29 RX ADMIN — HEPARIN SODIUM 5000 UNIT(S): 5000 INJECTION INTRAVENOUS; SUBCUTANEOUS at 17:52

## 2022-01-29 RX ADMIN — Medication 100 MILLIGRAM(S): at 13:05

## 2022-01-29 RX ADMIN — Medication 400 MILLIGRAM(S): at 06:00

## 2022-01-29 RX ADMIN — Medication 20 MILLIGRAM(S): at 23:17

## 2022-01-29 NOTE — CHART NOTE - NSCHARTNOTEFT_GEN_A_CORE
Patient is doing well, seen at bedside denies any new complaints. Denies any nausea, vomiting, chest pain, shortness of breath, calf tenderness, fever or chills. Reports that he had two bowel movements and he is feeling less bloated after. Per Dr. Gilmore NGT has been removed after putting out 400cc.     Constitutional: AAOx3, no acute distress  HEENT: NCAT, airway patent  Cardiovascular: RRR, pulses present bilaterally  Respiratory: nonlabored breathing  Gastrointestinal: abdomen soft, nontender, distended, no rebound or guarding  Neuro: no focal deficits    ICU Vital Signs Last 24 Hrs  T(C): 36.8 (28 Jan 2022 17:23), Max: 37.3 (28 Jan 2022 09:45)  T(F): 98.3 (28 Jan 2022 17:23), Max: 99.2 (28 Jan 2022 09:45)  HR: 68 (28 Jan 2022 20:40) (58 - 82)  BP: 160/77 (28 Jan 2022 20:40) (117/60 - 169/76)  BP(mean): 111 (28 Jan 2022 20:40) (83 - 119)  ABP: --  ABP(mean): --  RR: 18 (28 Jan 2022 20:40) (12 - 27)  SpO2: 96% (28 Jan 2022 20:40) (95% - 99%)
Patient is doing well, seen at bedside denies any new complaints. Denies any nausea, vomiting, chest pain, shortness of breath, calf tenderness, fever or chills. Reports that he has had 3-4 more bowel movements and is feeling much lighter. Abdominal distention appears improved     Constitutional: AAOx3, no acute distress  HEENT: NCAT, airway patent  Cardiovascular: RRR, pulses present bilaterally  Respiratory: nonlabored breathing  Gastrointestinal: abdomen soft, nontender, mildly distended, no rebound or guarding  Neuro: no focal deficits    ICU Vital Signs Last 24 Hrs  T(C): 36.8 (28 Jan 2022 22:13), Max: 37.3 (28 Jan 2022 09:45)  T(F): 98.2 (28 Jan 2022 22:13), Max: 99.2 (28 Jan 2022 09:45)  HR: 68 (28 Jan 2022 23:25) (58 - 80)  BP: 124/62 (28 Jan 2022 23:25) (124/62 - 169/76)  BP(mean): 87 (28 Jan 2022 23:25) (87 - 119)  ABP: --  ABP(mean): --  RR: 18 (28 Jan 2022 23:25) (17 - 18)  SpO2: 95% (28 Jan 2022 23:25) (95% - 99%)

## 2022-01-29 NOTE — PATIENT PROFILE ADULT - FALL HARM RISK - HARM RISK INTERVENTIONS

## 2022-01-29 NOTE — PROGRESS NOTE ADULT - ASSESSMENT
71M with acute cholecystitis now s/p laparoscopic cholecystectomy on 1/27. Mild post-operative ileus, resolved with return of bowel function (+F/+BM). Patient feels well. Ambulating.     PLAN:  - NG tube discontinued last night  - Pain/nausea control PRN  - **** f/u diet  - ABx: Cef/flagyl today?  - Strict I&Os  - DVT prophylaxis- continue to hold HSQ? SCDs, holding HSQ   - AM labs    71M with acute cholecystitis now s/p laparoscopic cholecystectomy on 1/27. Mild post-operative ileus, resolved with return of bowel function (+F/+BM). Patient feels well. Ambulating. NG tube discontinued last night.     PLAN:  - Pain/nausea control PRN  - Advance to low fat diet today   - Continue Cef/flagyl while in Hospital, possible continuation at discharge  - HLIV  - Restart HSQ  - Continue SAMEER drain while in Hospital, remove if discharged  - No AM labs

## 2022-01-30 ENCOUNTER — TRANSCRIPTION ENCOUNTER (OUTPATIENT)
Age: 72
End: 2022-01-30

## 2022-01-30 VITALS
TEMPERATURE: 97 F | HEART RATE: 75 BPM | SYSTOLIC BLOOD PRESSURE: 148 MMHG | OXYGEN SATURATION: 97 % | DIASTOLIC BLOOD PRESSURE: 76 MMHG | RESPIRATION RATE: 17 BRPM

## 2022-01-30 PROCEDURE — 85027 COMPLETE CBC AUTOMATED: CPT

## 2022-01-30 PROCEDURE — 83605 ASSAY OF LACTIC ACID: CPT

## 2022-01-30 PROCEDURE — 82247 BILIRUBIN TOTAL: CPT

## 2022-01-30 PROCEDURE — 96374 THER/PROPH/DIAG INJ IV PUSH: CPT

## 2022-01-30 PROCEDURE — 85610 PROTHROMBIN TIME: CPT

## 2022-01-30 PROCEDURE — 71045 X-RAY EXAM CHEST 1 VIEW: CPT

## 2022-01-30 PROCEDURE — A9585: CPT

## 2022-01-30 PROCEDURE — 87040 BLOOD CULTURE FOR BACTERIA: CPT

## 2022-01-30 PROCEDURE — 82977 ASSAY OF GGT: CPT

## 2022-01-30 PROCEDURE — 85025 COMPLETE CBC W/AUTO DIFF WBC: CPT

## 2022-01-30 PROCEDURE — 85730 THROMBOPLASTIN TIME PARTIAL: CPT

## 2022-01-30 PROCEDURE — 88300 SURGICAL PATH GROSS: CPT

## 2022-01-30 PROCEDURE — 96375 TX/PRO/DX INJ NEW DRUG ADDON: CPT

## 2022-01-30 PROCEDURE — 86900 BLOOD TYPING SEROLOGIC ABO: CPT

## 2022-01-30 PROCEDURE — 80053 COMPREHEN METABOLIC PANEL: CPT

## 2022-01-30 PROCEDURE — 86923 COMPATIBILITY TEST ELECTRIC: CPT

## 2022-01-30 PROCEDURE — 83690 ASSAY OF LIPASE: CPT

## 2022-01-30 PROCEDURE — 87635 SARS-COV-2 COVID-19 AMP PRB: CPT

## 2022-01-30 PROCEDURE — 82248 BILIRUBIN DIRECT: CPT

## 2022-01-30 PROCEDURE — 83735 ASSAY OF MAGNESIUM: CPT

## 2022-01-30 PROCEDURE — 74019 RADEX ABDOMEN 2 VIEWS: CPT

## 2022-01-30 PROCEDURE — 84100 ASSAY OF PHOSPHORUS: CPT

## 2022-01-30 PROCEDURE — 86803 HEPATITIS C AB TEST: CPT

## 2022-01-30 PROCEDURE — 86850 RBC ANTIBODY SCREEN: CPT

## 2022-01-30 PROCEDURE — 87086 URINE CULTURE/COLONY COUNT: CPT

## 2022-01-30 PROCEDURE — 88304 TISSUE EXAM BY PATHOLOGIST: CPT

## 2022-01-30 PROCEDURE — 86901 BLOOD TYPING SEROLOGIC RH(D): CPT

## 2022-01-30 PROCEDURE — 76705 ECHO EXAM OF ABDOMEN: CPT

## 2022-01-30 PROCEDURE — 80048 BASIC METABOLIC PNL TOTAL CA: CPT

## 2022-01-30 PROCEDURE — 81001 URINALYSIS AUTO W/SCOPE: CPT

## 2022-01-30 PROCEDURE — 36415 COLL VENOUS BLD VENIPUNCTURE: CPT

## 2022-01-30 PROCEDURE — 74183 MRI ABD W/O CNTR FLWD CNTR: CPT

## 2022-01-30 PROCEDURE — 99285 EMERGENCY DEPT VISIT HI MDM: CPT | Mod: 25

## 2022-01-30 RX ORDER — METRONIDAZOLE 500 MG
2 TABLET ORAL
Qty: 18 | Refills: 0
Start: 2022-01-30 | End: 2022-02-01

## 2022-01-30 RX ORDER — CEFPODOXIME PROXETIL 100 MG
2 TABLET ORAL
Qty: 12 | Refills: 0
Start: 2022-01-30 | End: 2022-02-01

## 2022-01-30 RX ADMIN — Medication 100 MILLIGRAM(S): at 14:59

## 2022-01-30 RX ADMIN — Medication 20 MILLIGRAM(S): at 06:13

## 2022-01-30 RX ADMIN — CEFTRIAXONE 100 MILLIGRAM(S): 500 INJECTION, POWDER, FOR SOLUTION INTRAMUSCULAR; INTRAVENOUS at 14:02

## 2022-01-30 RX ADMIN — HEPARIN SODIUM 5000 UNIT(S): 5000 INJECTION INTRAVENOUS; SUBCUTANEOUS at 11:18

## 2022-01-30 RX ADMIN — Medication 20 MILLIGRAM(S): at 14:02

## 2022-01-30 RX ADMIN — HEPARIN SODIUM 5000 UNIT(S): 5000 INJECTION INTRAVENOUS; SUBCUTANEOUS at 01:21

## 2022-01-30 RX ADMIN — Medication 100 MILLIGRAM(S): at 06:13

## 2022-01-30 NOTE — DISCHARGE NOTE NURSING/CASE MANAGEMENT/SOCIAL WORK - PATIENT PORTAL LINK FT
You can access the FollowMyHealth Patient Portal offered by [facility] by registering at the following website: [email or web address]. By joining [employer]’s FollowMyHealth portal, you will also be able to view your health information using other applications (apps) compatible with our system.

## 2022-01-30 NOTE — DISCHARGE NOTE NURSING/CASE MANAGEMENT/SOCIAL WORK - NSDCPEFALRISK_GEN_ALL_CORE
For information on Fall & Injury Prevention, visit: [email or web address] OR  [email or web address] OR  https://www.cdc.gov/steadi/patient.html

## 2022-01-30 NOTE — PROGRESS NOTE ADULT - ASSESSMENT
71M with pmh of HTN, HLD, gout, BPH, MM s/p stem cell transplant and no previous abdominal surgeries who present to [facility] with choledocholithiasis vs gallstone pancreatitis now s/p laparoscopic cholecystectomy on 1/27.      Discharge today with PO abx, remove SAMEER

## 2022-01-30 NOTE — PROGRESS NOTE ADULT - SUBJECTIVE AND OBJECTIVE BOX
SUBJECTIVE:   Patient seen and evaluated. Patient denies any nausea. Patient says that he is ambulating without difficulty. Patient says that he feels "well." Patient otherwise notes mild abdominal pain. He says that he is tolerating a PO diet.     labetalol Injectable 20 milliGRAM(s) IV Push every 6 hours      Vital Signs Last 24 Hrs  T(C): 36.9 (2022 04:55), Max: 38.6 (2022 10:32)  T(F): 98.5 (2022 04:55), Max: 101.4 (2022 10:32)  HR: 58 (2022 05:58) (58 - 86)  BP: 124/67 (2022 05:58) (117/60 - 160/77)  BP(mean): 89 (2022 05:58) (83 - 111)  RR: 18 (2022 05:58) (12 - 27)  SpO2: 98% (2022 05:58) (95% - 99%)  I&O's Detail    2022 07:01  -  2022 07:00  --------------------------------------------------------  IN:    IV PiggyBack: 260 mL    Lactated Ringers: 2260 mL    Oral Fluid: 120 mL  Total IN: 2640 mL    OUT:    Bulb (mL): 75 mL    Voided (mL): 600 mL  Total OUT: 675 mL    Total NET: 1965 mL          General: NAD, resting comfortably in bed  C/V: Normal rate.   Pulm: Nonlabored breathing, no respiratory distress. Speaking in complete sentences.  Abd: soft, mild abdominal distention. Surgical incision sites c/d/i; no erythema, purulence, or focal edema; appropriately TTP. R SAMEER drain in place and collecting serosanguinous fluid.   Extrem: WWP, no edema      LABS:                        10.4   11.84 )-----------( 107      ( 2022 06:10 )             31.9     -    138  |  107  |  37<H>  ----------------------------<  163<H>  4.5   |  19<L>  |  1.80<H>    Ca    7.7<L>      2022 06:10  Phos  4.2       Mg     2.1         TPro  5.8<L>  /  Alb  2.8<L>  /  TBili  1.0  /  DBili  x   /  AST  159<H>  /  ALT  121<H>  /  AlkPhos  147<H>      PT/INR - ( 2022 22:47 )   PT: 11.9 sec;   INR: 0.99          PTT - ( 2022 22:47 )  PTT:38.3 sec  Urinalysis Basic - ( 2022 23:15 )    Color: Yellow / Appearance: SL Cloudy / S.025 / pH: x  Gluc: x / Ketone: NEGATIVE  / Bili: Negative / Urobili: 0.2 E.U./dL   Blood: x / Protein: 100 mg/dL / Nitrite: POSITIVE   Leuk Esterase: Small / RBC: 5-10 /HPF / WBC > 10 /HPF   Sq Epi: x / Non Sq Epi: 0-5 /HPF / Bacteria: Many /HPF          
SUBJECTIVE:  Examined at bedside this morning, has some epigastric pain that has improved. No nausea or vomiting. No acute complaints.    MEDICATIONS  (STANDING):  cefTRIAXone Injectable. 1000 milliGRAM(s) IV Push every 24 hours  labetalol Injectable 20 milliGRAM(s) IV Push every 6 hours  lactated ringers. 1000 milliLiter(s) (110 mL/Hr) IV Continuous <Continuous>  metroNIDAZOLE  IVPB 500 milliGRAM(s) IV Intermittent every 8 hours    MEDICATIONS  (PRN):      Vital Signs Last 24 Hrs  T(C): 38 (2022 09:15), Max: 38 (2022 09:15)  T(F): 100.4 (2022 09:15), Max: 100.4 (2022 09:15)  HR: 76 (2022 08:53) (71 - 76)  BP: 148/73 (2022 08:53) (148/73 - 165/77)  BP(mean): 104 (2022 08:53) (104 - 111)  RR: 18 (2022 08:53) (18 - 18)  SpO2: 98% (2022 08:53) (95% - 98%)    PHYSICAL EXAM:    Constitutional: A&Ox3    Respiratory: non labored breathing, no respiratory distress    Cardiovascular: NSR, RRR    Gastrointestinal: abd soft, mildly distended, nontympanic, mild TTP in epigastric region, no rebound, no guarding    Genitourinary: voiding appropriately    Extremities: (-) edema, wwp                  I&O's Detail    2022 07:01  -  2022 07:00  --------------------------------------------------------  IN:    IV PiggyBack: 200 mL    Lactated Ringers: 440 mL  Total IN: 640 mL    OUT:    Voided (mL): 850 mL  Total OUT: 850 mL    Total NET: -210 mL          LABS:                        13.2   11.05 )-----------( 112      ( 2022 06:01 )             39.9         136  |  104  |  43<H>  ----------------------------<  106<H>  see note   |  19<L>  |  1.63<H>    Ca    8.1<L>      2022 06:01  Phos  2.6       Mg     1.9         TPro  6.7  /  Alb  3.5  /  TBili  1.4<H>  /  DBili  0.5<H>  /  AST  see note  /  ALT  see note  /  AlkPhos  168<H>      PT/INR - ( 2022 22:47 )   PT: 11.9 sec;   INR: 0.99          PTT - ( 2022 22:47 )  PTT:38.3 sec  Urinalysis Basic - ( 2022 23:15 )    Color: Yellow / Appearance: SL Cloudy / S.025 / pH: x  Gluc: x / Ketone: NEGATIVE  / Bili: Negative / Urobili: 0.2 E.U./dL   Blood: x / Protein: 100 mg/dL / Nitrite: POSITIVE   Leuk Esterase: Small / RBC: 5-10 /HPF / WBC > 10 /HPF   Sq Epi: x / Non Sq Epi: 0-5 /HPF / Bacteria: Many /HPF        RADIOLOGY & ADDITIONAL STUDIES:
INTERVAL HPI/OVERNIGHT EVENTS: scott, vss    POD #3: lap my      SUBJECTIVE:  Patient is doing well this morning, seen at bedside with chief, denies any new complaints. Denies any nausea, vomiting, chest pain, shortness of breath, calf tenderness, fever or chills. Reports +bm/+f. Tolerating diet, ambulating as tolerated.    MEDICATIONS  (STANDING):  cefTRIAXone   IVPB      cefTRIAXone   IVPB 1000 milliGRAM(s) IV Intermittent every 24 hours  heparin   Injectable 5000 Unit(s) SubCutaneous every 8 hours  labetalol Injectable 20 milliGRAM(s) IV Push every 6 hours  metroNIDAZOLE  IVPB 500 milliGRAM(s) IV Intermittent every 8 hours    MEDICATIONS  (PRN):  acetaminophen     Tablet .. 650 milliGRAM(s) Oral every 6 hours PRN Mild Pain (1 - 3), Moderate Pain (4 - 6)  ondansetron Injectable 4 milliGRAM(s) IV Push every 6 hours PRN Nausea and/or Vomiting      Vital Signs Last 24 Hrs  T(C): 36.6 (30 Jan 2022 08:30), Max: 36.8 (29 Jan 2022 14:03)  T(F): 97.8 (30 Jan 2022 08:30), Max: 98.3 (29 Jan 2022 14:03)  HR: 66 (30 Jan 2022 08:30) (66 - 84)  BP: 151/75 (30 Jan 2022 08:30) (126/78 - 168/81)  BP(mean): 117 (29 Jan 2022 17:55) (103 - 117)  RR: 16 (30 Jan 2022 08:30) (16 - 18)  SpO2: 96% (30 Jan 2022 08:30) (95% - 98%)    PHYSICAL EXAM:  Constitutional: AAOx3, no acute distress  HEENT: NCAT, airway patent  Cardiovascular: RRR, pulses present bilaterally  Respiratory: nonlabored breathing  Gastrointestinal: abdomen soft, nontender, non distended, no rebound or guarding, incsions are clean dry and intact, SAMEER in place with serosang output   Neuro: no focal deficits  Extremities: no edema                  I&O's Detail    29 Jan 2022 07:01  -  30 Jan 2022 07:00  --------------------------------------------------------  IN:    IV PiggyBack: 200 mL    Lactated Ringers: 85 mL    Oral Fluid: 420 mL  Total IN: 705 mL    OUT:    Bulb (mL): 38 mL    Voided (mL): 1025 mL  Total OUT: 1063 mL    Total NET: -358 mL      30 Jan 2022 07:01  -  30 Jan 2022 10:53  --------------------------------------------------------  IN:    Oral Fluid: 360 mL  Total IN: 360 mL    OUT:    Voided (mL): 500 mL  Total OUT: 500 mL    Total NET: -140 mL          LABS:                        11.6   13.65 )-----------( 180      ( 29 Jan 2022 07:47 )             37.0     01-29    142  |  108  |  45<H>  ----------------------------<  128<H>  4.6   |  20<L>  |  2.09<H>    Ca    8.4      29 Jan 2022 07:47  Phos  3.2     01-29  Mg     2.4     01-29    TPro  6.8  /  Alb  3.4  /  TBili  0.6  /  DBili  x   /  AST  64<H>  /  ALT  85<H>  /  AlkPhos  142<H>  01-29          RADIOLOGY & ADDITIONAL STUDIES:
Procedure: laparoscopic cholecystectomy   Surgeon: Dr. Gilmore    S: Pt has no complaints. Denies CP, SOB, PABON, calf tenderness. Pain controlled with medication.    O:  T(C): 37.2 (01-27-22 @ 22:31), Max: 37.6 (01-27-22 @ 21:40)  T(F): 99 (01-27-22 @ 22:31), Max: 99.7 (01-27-22 @ 21:40)  HR: 70 (01-28-22 @ 00:30) (70 - 82)  BP: 120/66 (01-28-22 @ 00:30) (117/60 - 132/63)  RR: 18 (01-28-22 @ 00:30) (12 - 27)  SpO2: 97% (01-28-22 @ 00:30) (95% - 99%)  Wt(kg): --                        11.2   11.92 )-----------( 117      ( 27 Jan 2022 21:55 )             34.5     01-27    140  |  109<H>  |  39<H>  ----------------------------<  183<H>  4.8   |  19<L>  |  1.70<H>    Ca    8.0<L>      27 Jan 2022 21:56  Phos  3.5     01-27  Mg     1.8     01-27    TPro  6.8  /  Alb  3.4  /  TBili  1.6<H>  /  DBili  x   /  AST  281<H>  /  ALT  179<H>  /  AlkPhos  181<H>  01-27      Gen: NAD, resting comfortably in bed  C/V: NSR  Pulm: Nonlabored breathing, no respiratory distress  Abd: soft, appropriately tender to palpation, mildly distended, ecchymosis in RLQ, RUQ drain in place  Extrem: WWP, no calf edema, SCDs in place      
SUBJECTIVE:   Patient seen and evaluated. Patient notes that he is passing flatus as well as produced several bowel movements overnight. He notes that overall he feels, "better."     cefTRIAXone   IVPB      cefTRIAXone   IVPB 1000 milliGRAM(s) IV Intermittent every 24 hours  labetalol Injectable 20 milliGRAM(s) IV Push every 6 hours  metroNIDAZOLE  IVPB 500 milliGRAM(s) IV Intermittent every 8 hours      Vital Signs Last 24 Hrs  T(C): 36.8 (29 Jan 2022 04:40), Max: 37.3 (28 Jan 2022 09:45)  T(F): 98.2 (29 Jan 2022 04:40), Max: 99.2 (28 Jan 2022 09:45)  HR: 68 (29 Jan 2022 04:25) (68 - 80)  BP: 150/76 (29 Jan 2022 04:25) (124/62 - 169/76)  BP(mean): 105 (29 Jan 2022 04:25) (87 - 119)  RR: 18 (29 Jan 2022 04:25) (17 - 18)  SpO2: 94% (29 Jan 2022 04:25) (94% - 99%)  I&O's Detail    28 Jan 2022 07:01  -  29 Jan 2022 07:00  --------------------------------------------------------  IN:    IV PiggyBack: 650 mL    Lactated Ringers: 1445 mL    Lactated Ringers: 220 mL  Total IN: 2315 mL    OUT:    Bulb (mL): 55 mL    Nasogastric/Oral tube (mL): 365 mL    Voided (mL): 1815 mL  Total OUT: 2235 mL    Total NET: 80 mL          General: NAD, resting comfortably in chair  C/V: Normal rate.   Pulm: Nonlabored breathing, no respiratory distress. Speaking in complete sentences.  Abd: softer abdomen, mild persistent abdominal distention, improved. Surgical incision sites c/d/i; no erythema, purulence, or focal edema; appropriately TTP. SAMEER drain in place with serosanguinous fluid collected.    Extrem: WWP, no edema    LABS:                        11.6   13.65 )-----------( 180      ( 29 Jan 2022 07:47 )             37.0     01-28    138  |  107  |  37<H>  ----------------------------<  163<H>  4.5   |  19<L>  |  1.80<H>    Ca    7.7<L>      28 Jan 2022 06:10  Phos  4.2     01-28  Mg     2.1     01-28    TPro  5.8<L>  /  Alb  2.8<L>  /  TBili  1.0  /  DBili  x   /  AST  159<H>  /  ALT  121<H>  /  AlkPhos  147<H>  01-28

## 2022-01-30 NOTE — PROGRESS NOTE ADULT - REASON FOR ADMISSION
Gallstone pancreatitis vs choledocholithiasis

## 2022-02-01 LAB
CULTURE RESULTS: SIGNIFICANT CHANGE UP
CULTURE RESULTS: SIGNIFICANT CHANGE UP
SPECIMEN SOURCE: SIGNIFICANT CHANGE UP
SPECIMEN SOURCE: SIGNIFICANT CHANGE UP

## 2022-02-02 DIAGNOSIS — K56.7 ILEUS, UNSPECIFIED: ICD-10-CM

## 2022-02-02 DIAGNOSIS — M10.9 GOUT, UNSPECIFIED: ICD-10-CM

## 2022-02-02 DIAGNOSIS — I10 ESSENTIAL (PRIMARY) HYPERTENSION: ICD-10-CM

## 2022-02-02 DIAGNOSIS — K85.10 BILIARY ACUTE PANCREATITIS WITHOUT NECROSIS OR INFECTION: ICD-10-CM

## 2022-02-02 DIAGNOSIS — K80.00 CALCULUS OF GALLBLADDER WITH ACUTE CHOLECYSTITIS WITHOUT OBSTRUCTION: ICD-10-CM

## 2022-02-02 DIAGNOSIS — D17.71 BENIGN LIPOMATOUS NEOPLASM OF KIDNEY: ICD-10-CM

## 2022-02-02 DIAGNOSIS — N40.0 BENIGN PROSTATIC HYPERPLASIA WITHOUT LOWER URINARY TRACT SYMPTOMS: ICD-10-CM

## 2022-02-02 DIAGNOSIS — E78.5 HYPERLIPIDEMIA, UNSPECIFIED: ICD-10-CM

## 2022-02-02 LAB — SURGICAL PATHOLOGY STUDY: SIGNIFICANT CHANGE UP

## 2022-02-07 PROBLEM — C90.00 MULTIPLE MYELOMA NOT HAVING ACHIEVED REMISSION: Chronic | Status: ACTIVE | Noted: 2022-01-26

## 2022-02-07 PROBLEM — N40.0 BENIGN PROSTATIC HYPERPLASIA WITHOUT LOWER URINARY TRACT SYMPTOMS: Chronic | Status: ACTIVE | Noted: 2022-01-26

## 2022-02-07 PROBLEM — E78.00 PURE HYPERCHOLESTEROLEMIA, UNSPECIFIED: Chronic | Status: ACTIVE | Noted: 2022-01-26

## 2022-02-07 PROBLEM — I10 ESSENTIAL (PRIMARY) HYPERTENSION: Chronic | Status: ACTIVE | Noted: 2022-01-26

## 2022-02-07 PROBLEM — M10.9 GOUT, UNSPECIFIED: Chronic | Status: ACTIVE | Noted: 2022-01-26

## 2022-02-09 PROBLEM — Z87.19 HISTORY OF GALLSTONES: Status: ACTIVE | Noted: 2022-02-09

## 2022-02-09 PROBLEM — Z87.438 HISTORY OF BENIGN PROSTATIC HYPERPLASIA: Status: RESOLVED | Noted: 2022-02-09 | Resolved: 2022-02-09

## 2022-02-09 PROBLEM — Z00.00 ENCOUNTER FOR PREVENTIVE HEALTH EXAMINATION: Status: ACTIVE | Noted: 2022-02-09

## 2022-02-09 PROBLEM — Z87.39 HISTORY OF GOUT: Status: RESOLVED | Noted: 2022-02-09 | Resolved: 2022-02-09

## 2022-02-09 PROBLEM — Z86.39 HISTORY OF HYPERLIPIDEMIA: Status: RESOLVED | Noted: 2022-02-09 | Resolved: 2022-02-09

## 2022-02-09 PROBLEM — Z86.79 HISTORY OF HYPERTENSION: Status: RESOLVED | Noted: 2022-02-09 | Resolved: 2022-02-09

## 2022-02-09 RX ORDER — ALLOPURINOL 300 MG/1
300 TABLET ORAL
Refills: 0 | Status: ACTIVE | COMMUNITY

## 2022-02-09 RX ORDER — ATORVASTATIN CALCIUM 20 MG/1
20 TABLET, FILM COATED ORAL
Refills: 0 | Status: ACTIVE | COMMUNITY

## 2022-02-09 RX ORDER — CALCITRIOL 0.25 UG/1
0.25 CAPSULE, LIQUID FILLED ORAL
Refills: 0 | Status: ACTIVE | COMMUNITY

## 2022-02-09 RX ORDER — TAMSULOSIN HYDROCHLORIDE 0.4 MG/1
0.4 CAPSULE ORAL
Refills: 0 | Status: ACTIVE | COMMUNITY

## 2022-02-09 RX ORDER — LABETALOL HYDROCHLORIDE 100 MG/1
100 TABLET, FILM COATED ORAL
Refills: 0 | Status: ACTIVE | COMMUNITY

## 2022-02-09 RX ORDER — FINASTERIDE 5 MG/1
5 TABLET, FILM COATED ORAL
Refills: 0 | Status: ACTIVE | COMMUNITY

## 2022-02-10 DIAGNOSIS — K80.01 CALCULUS OF GALLBLADDER WITH ACUTE CHOLECYSTITIS WITH OBSTRUCTION: ICD-10-CM

## 2022-02-14 ENCOUNTER — APPOINTMENT (OUTPATIENT)
Dept: SURGICAL ONCOLOGY | Facility: CLINIC | Age: 72
End: 2022-02-14
Payer: MEDICARE

## 2022-02-14 VITALS
DIASTOLIC BLOOD PRESSURE: 81 MMHG | OXYGEN SATURATION: 97 % | TEMPERATURE: 98.6 F | HEART RATE: 69 BPM | WEIGHT: 161 LBS | BODY MASS INDEX: 24.4 KG/M2 | HEIGHT: 68 IN | SYSTOLIC BLOOD PRESSURE: 145 MMHG

## 2022-02-14 DIAGNOSIS — Z87.438 PERSONAL HISTORY OF OTHER DISEASES OF MALE GENITAL ORGANS: ICD-10-CM

## 2022-02-14 DIAGNOSIS — Z86.79 PERSONAL HISTORY OF OTHER DISEASES OF THE CIRCULATORY SYSTEM: ICD-10-CM

## 2022-02-14 DIAGNOSIS — Z87.39 PERSONAL HISTORY OF OTHER DISEASES OF THE MUSCULOSKELETAL SYSTEM AND CONNECTIVE TISSUE: ICD-10-CM

## 2022-02-14 DIAGNOSIS — Z86.39 PERSONAL HISTORY OF OTHER ENDOCRINE, NUTRITIONAL AND METABOLIC DISEASE: ICD-10-CM

## 2022-02-14 DIAGNOSIS — Z87.19 PERSONAL HISTORY OF OTHER DISEASES OF THE DIGESTIVE SYSTEM: ICD-10-CM

## 2022-02-14 PROCEDURE — 99024 POSTOP FOLLOW-UP VISIT: CPT

## 2022-02-14 NOTE — ASSESSMENT
[FreeTextEntry1] : I) s/p lap choley , normal postop\par \par P) Reviewed pathology. Advised lo fat diet for next couple of weeks. Can see us as needed. \par \par Neo Gilmore MD\par \par Chief Surgical Oncology\par Multidisciplinary GI cancer program\par [facility]\par [facility]\par \par Professor Surgery\par [employer]\par \par 
3.19

## 2022-02-14 NOTE — HISTORY OF PRESENT ILLNESS
[FreeTextEntry1] : Patient Name: ELIA GAVIRIA \par MRN: [id number] \par Vicente MRN: [id number] \par Referring Provider: none \par Oncologist: n/a\par Date: 2/14/22\par \par Diagnosis: acute cholecystitis\par Operative Date: 1/27/22\par Procedure: laparoscopic cholecystectomy\par \par He presents for a scheduled post operative visit. He is 18 days post op and feels well today. \par \par He presented to the ED with 2-3 days of abdominal pain. He was found to have acute cholecystitis as evidence by MRI and an elevated bilirubin and lipase level. \par \par Currently, Mr. GAVIRIA denies abdominal pain and discomfort. He denies fevers, chills, or night sweats. He is tolerating a low fat diet and is having regular bowel movements. Pain is controlled.\par \par Final Pathology Showed: gangrenous cholecystitis with fibropurulent serositis, cholelithiasis\par \par

## 2022-02-14 NOTE — PHYSICAL EXAM
[Normal] : well developed, well nourished, in no acute distress [de-identified] : soft, surgical sites are healing well without any signs of infection

## 2022-08-15 ENCOUNTER — NON-APPOINTMENT (OUTPATIENT)
Age: 72
End: 2022-08-15

## 2022-08-15 ENCOUNTER — APPOINTMENT (OUTPATIENT)
Dept: SURGICAL ONCOLOGY | Facility: CLINIC | Age: 72
End: 2022-08-15

## 2022-08-15 VITALS
BODY MASS INDEX: 25.61 KG/M2 | WEIGHT: 169 LBS | TEMPERATURE: 98.2 F | OXYGEN SATURATION: 95 % | SYSTOLIC BLOOD PRESSURE: 146 MMHG | DIASTOLIC BLOOD PRESSURE: 82 MMHG | HEIGHT: 68 IN | HEART RATE: 88 BPM

## 2022-08-15 DIAGNOSIS — Z90.49 ACQUIRED ABSENCE OF OTHER SPECIFIED PARTS OF DIGESTIVE TRACT: ICD-10-CM

## 2022-08-15 DIAGNOSIS — K86.2 CYST OF PANCREAS: ICD-10-CM

## 2022-08-15 PROCEDURE — 99213 OFFICE O/P EST LOW 20 MIN: CPT

## 2022-08-15 NOTE — PHYSICAL EXAM
[Normal] : not distended, non tender, no masses, no hepatosplenomegaly [de-identified] : well healed scars

## 2022-08-15 NOTE — ASSESSMENT
[FreeTextEntry1] : I) normal follow up\par \par P) reviewed the imaging reports and the GB findings are consistent w post choley surgery. The pancreas cyst is not significant and can be watched w his routine myeloma imaging. No intervention needed. All questions answered. Can see us PRN\par \par Neo Gilmore MD\par \par Chief Surgical Oncology\par Multidisciplinary GI cancer program\par [facility]\par [facility]\par \par Professor Surgery\par [employer]\par

## 2022-08-15 NOTE — HISTORY OF PRESENT ILLNESS
[de-identified] : Patient Name: Zackary Solo\par MRN: [id number]\par Referring Provider: None\par Oncologist: n/a\par \par Diagnosis: Acute Cholecystitis\par Operative Date: 1/27/22\par Procedure: Laparoscopic Cholecystectomy\par Final Pathology Showed: Gangrenous cholecystitis with fibropurulent serositis, cholelithiasis\par \par Currently: Mr. GAVIRIA is doing well and presents today to f/u on incidental finding on recent PET scan. Pt with hx of multiple myeloma s/p stem cell transplant and undergoes PET scan q2 years for surveillance. Pt found to have uptake in gallbladder region, recommending further MRI imaging. MRI revealed 3mm pancreatic cystic lesion which he was told to f/u with us for further evaluation. Pt denies fevers, chills, night sweats, recent wt loss. Denies any abd pn, change in appetite, n,v,c,d. Pt to obtain discs from recent imaging to provide for his next f/u to review.

## 2025-03-12 NOTE — ED PROVIDER NOTE - CADM POA PRESS ULCER
Writer called patient with negative lab result patient verbalized understanding call back number given [phone] no issues or concerns reported at this time.    
No

## (undated) DEVICE — LAPAROSCOPIC ENDO FLO IRRIGATOR DISP

## (undated) DEVICE — TUBING STRYKER PNEUMOCLEAR HIGH FLOW

## (undated) DEVICE — TROCAR APPLIED MEDICAL KII FIOS FIRST ENTRY 5MM X 100MM Z-THREAD

## (undated) DEVICE — TUBING SUCTION NONCONDUCTIVE 6MM X 12FT

## (undated) DEVICE — SUT VICRYL 0 27" UR-6

## (undated) DEVICE — GLV 7.5 PROTEXIS (WHITE)

## (undated) DEVICE — CLIP APPLR DISP 5MM

## (undated) DEVICE — Device

## (undated) DEVICE — ELCTR SHAFT ETHICON  ENDOPATH PLUS II HOOK 5MM X 34CM

## (undated) DEVICE — BLADE SURGICAL #15 CARBON

## (undated) DEVICE — SUT VICRYL 0 18" ENDOLOOP LIGATURE

## (undated) DEVICE — D HELP - CLEARVIEW CLEARIFY SYSTEM

## (undated) DEVICE — VENODYNE/SCD SLEEVE CALF MEDIUM

## (undated) DEVICE — NDL MAX CORE 18G X 25CM

## (undated) DEVICE — WARMING BLANKET UPPER ADULT

## (undated) DEVICE — SUT MONOCRYL 4-0 27" PS-2 UNDYED

## (undated) DEVICE — POSITIONER FOAM EGG CRATE ULNAR 2PCS (PINK)

## (undated) DEVICE — DRSG DERMABOND 0.7ML

## (undated) DEVICE — SLEEVE APPLIED MEDICAL KII 5MM X100MM Z-THREAD

## (undated) DEVICE — TUBING PLUME AWAY 4.0

## (undated) DEVICE — PACK GENERAL LAPAROSCOPY

## (undated) DEVICE — BAG ETHICON SPECIMEN RETRIEVAL 4 X 6"

## (undated) DEVICE — TIP METZENBAUM SCISSOR MONOPOLAR ENDOCUT (ORANGE)

## (undated) DEVICE — HANDLE ETHICON ENDOPATH PROBE PLUS II PISTOL GRIP 5MM

## (undated) DEVICE — SPONGE ENDO PEANUT 5MM

## (undated) DEVICE — ELCTR GROUNDING PAD ADULT COVIDIEN